# Patient Record
Sex: FEMALE | Race: WHITE | Employment: FULL TIME | URBAN - METROPOLITAN AREA
[De-identification: names, ages, dates, MRNs, and addresses within clinical notes are randomized per-mention and may not be internally consistent; named-entity substitution may affect disease eponyms.]

---

## 2024-08-12 ENCOUNTER — HOSPITAL ENCOUNTER (EMERGENCY)
Age: 29
Discharge: HOME | End: 2024-08-12
Payer: COMMERCIAL

## 2024-08-12 ENCOUNTER — APPOINTMENT (OUTPATIENT)
Dept: CT IMAGING | Age: 29
End: 2024-08-12
Payer: COMMERCIAL

## 2024-08-12 VITALS
RESPIRATION RATE: 16 BRPM | SYSTOLIC BLOOD PRESSURE: 135 MMHG | DIASTOLIC BLOOD PRESSURE: 86 MMHG | HEART RATE: 87 BPM | BODY MASS INDEX: 26.12 KG/M2 | HEIGHT: 64 IN | TEMPERATURE: 98.6 F | OXYGEN SATURATION: 98 % | WEIGHT: 153 LBS

## 2024-08-12 DIAGNOSIS — S09.90XA INJURY OF HEAD, INITIAL ENCOUNTER: Primary | ICD-10-CM

## 2024-08-12 LAB — HCG UR QL: NEGATIVE

## 2024-08-12 PROCEDURE — 99284 EMERGENCY DEPT VISIT MOD MDM: CPT

## 2024-08-12 PROCEDURE — 81025 URINE PREGNANCY TEST: CPT

## 2024-08-12 PROCEDURE — 70486 CT MAXILLOFACIAL W/O DYE: CPT

## 2024-08-12 RX ORDER — CYCLOBENZAPRINE HCL 10 MG
5 TABLET ORAL 2 TIMES DAILY PRN
COMMUNITY
Start: 2022-04-11

## 2024-08-12 RX ORDER — DEXTROAMPHETAMINE SACCHARATE, AMPHETAMINE ASPARTATE MONOHYDRATE, DEXTROAMPHETAMINE SULFATE AND AMPHETAMINE SULFATE 2.5; 2.5; 2.5; 2.5 MG/1; MG/1; MG/1; MG/1
10 CAPSULE, EXTENDED RELEASE ORAL DAILY
COMMUNITY
Start: 2024-07-18 | End: 2024-08-17

## 2024-08-12 RX ORDER — FOLIC ACID 1 MG/1
1 TABLET ORAL DAILY
COMMUNITY
Start: 2024-07-17

## 2024-08-12 RX ORDER — METHOTREXATE 2.5 MG/1
10 TABLET ORAL WEEKLY
COMMUNITY
Start: 2024-07-17 | End: 2024-10-15

## 2024-08-12 RX ORDER — UREA 10 %
3 LOTION (ML) TOPICAL NIGHTLY
COMMUNITY

## 2024-08-12 RX ORDER — ONDANSETRON 4 MG/1
4 TABLET, ORALLY DISINTEGRATING ORAL 3 TIMES DAILY PRN
COMMUNITY
Start: 2021-12-30

## 2024-08-12 RX ORDER — ESCITALOPRAM OXALATE 10 MG/1
15 TABLET ORAL DAILY
COMMUNITY
Start: 2024-07-18 | End: 2025-01-14

## 2024-08-12 RX ORDER — CETIRIZINE HYDROCHLORIDE 10 MG/1
10 TABLET ORAL DAILY
COMMUNITY
Start: 2015-01-03

## 2024-08-12 RX ORDER — CELECOXIB 100 MG/1
100 CAPSULE ORAL 2 TIMES DAILY
COMMUNITY
Start: 2024-06-07

## 2024-08-12 ASSESSMENT — PAIN - FUNCTIONAL ASSESSMENT: PAIN_FUNCTIONAL_ASSESSMENT: 0-10

## 2024-08-12 ASSESSMENT — PAIN DESCRIPTION - LOCATION: LOCATION: HEAD

## 2024-08-12 ASSESSMENT — PAIN SCALES - GENERAL: PAINLEVEL_OUTOF10: 5

## 2024-08-12 NOTE — ED PROVIDER NOTES
Emergency Department Provider Note       PCP: Brianda Treadwell MD   Age: 29 y.o.   Sex: female     DISPOSITION Decision To Discharge 08/12/2024 02:11:07 PM       ICD-10-CM    1. Injury of head, initial encounter  S09.90XA         Medical Decision Making     29 female presents this department with complaints of right sided jaw injury.  This occurred at work.  CT maxillofacial was obtained, this did not demonstrate any acute abnormality.  Patient was discharged home with symptomatic treatment.     Complexity of Problem: 1 acute complicated illness or injury.  Over the counter drug management performed.  Patient was discharged risks and benefits of hospitalization were considered.  Shared medical decision making was utilized in creating the patients health plan today.    I independently ordered and reviewed each unique test.  I reviewed external records: ED visit note from an outside group.  I reviewed external records: provider visit note from outside specialist.  I reviewed external records: previous lab results from outside ED.   I interpreted the CT Scan never confirmed.              Voice dictation software was used during the making of this note.  This software is not perfect and grammatical and other typographical errors may be present.  This note has not been completely proofread for errors.    History     Patient is a 29-year-old female who presents here with supervisor from work for a work-related injury.  Approximately 30 minutes prior to arrival, patient was working on his machinery, attempted to unjam its, during the process, the sheet-metal came up and struck the right side of her face.  Sustained a small superficial laceration.  She states over the next 10 to 20 minutes she developed a right sided jaw pain.  Discussed with supervisor so they present here for evaluation.  She has not used any analgesic medications.  Opening and closing her mouth causes exacerbation of the right sided pain.  No other

## 2024-08-12 NOTE — ED TRIAGE NOTES
Pt ambulatory to triage for reports of. Pt states she was using a machine that forms a shell of a tank when part of it came back & hit her R side of head. Pt states this incident occurred approximately 20 min prior to arrival. Pt denies full LOC. Pt reporting 5/10 head/jaw pain at this time. Pt denies any nausea at this time. Pt with superficial laceration to R jawline, bleeding controlled.

## 2024-08-12 NOTE — DISCHARGE INSTRUCTIONS
Everything looks good on your scan, no broken bones or any abnormalities were seen.  Keep wound clean and dry.  Beyond that, ice, Tylenol and ibuprofen for discomfort.